# Patient Record
Sex: MALE | URBAN - METROPOLITAN AREA
[De-identification: names, ages, dates, MRNs, and addresses within clinical notes are randomized per-mention and may not be internally consistent; named-entity substitution may affect disease eponyms.]

---

## 2021-03-08 ENCOUNTER — OFFICE VISIT (OUTPATIENT)
Dept: URBAN - METROPOLITAN AREA CLINIC 124 | Facility: CLINIC | Age: 46
End: 2021-03-08

## 2021-03-08 NOTE — HPI-TODAY'S VISIT:
The patient was referred by  _____for _____ .   A copy of this document is being forwarded to the referring provider. Labs in Dec Hep C AB +, Genotype 3. No quantitative load. AST 34 ALT 49  TB 0.5

## 2021-04-26 ENCOUNTER — OFFICE VISIT (OUTPATIENT)
Dept: URBAN - METROPOLITAN AREA CLINIC 124 | Facility: CLINIC | Age: 46
End: 2021-04-26